# Patient Record
Sex: MALE | Race: WHITE | NOT HISPANIC OR LATINO | URBAN - METROPOLITAN AREA
[De-identification: names, ages, dates, MRNs, and addresses within clinical notes are randomized per-mention and may not be internally consistent; named-entity substitution may affect disease eponyms.]

---

## 2018-05-15 ENCOUNTER — OFFICE VISIT - HEALTHEAST (OUTPATIENT)
Dept: FAMILY MEDICINE | Facility: CLINIC | Age: 43
End: 2018-05-15

## 2018-05-15 ENCOUNTER — RECORDS - HEALTHEAST (OUTPATIENT)
Dept: GENERAL RADIOLOGY | Facility: CLINIC | Age: 43
End: 2018-05-15

## 2018-05-15 DIAGNOSIS — M79.671 PAIN IN RIGHT FOOT: ICD-10-CM

## 2018-05-15 DIAGNOSIS — M79.671 RIGHT FOOT PAIN: ICD-10-CM

## 2018-05-15 DIAGNOSIS — M19.071 OSTEOARTHRITIS OF RIGHT FOOT: ICD-10-CM

## 2018-06-01 ENCOUNTER — OFFICE VISIT - HEALTHEAST (OUTPATIENT)
Dept: FAMILY MEDICINE | Facility: CLINIC | Age: 43
End: 2018-06-01

## 2018-06-01 DIAGNOSIS — J02.0 STREP PHARYNGITIS: ICD-10-CM

## 2018-06-01 LAB — DEPRECATED S PYO AG THROAT QL EIA: ABNORMAL

## 2018-06-01 RX ORDER — ATORVASTATIN CALCIUM 20 MG/1
20 TABLET, FILM COATED ORAL DAILY
Refills: 3 | Status: SHIPPED | COMMUNITY
Start: 2018-05-08

## 2018-06-01 RX ORDER — LISINOPRIL 10 MG/1
10 TABLET ORAL DAILY
Refills: 11 | Status: SHIPPED | COMMUNITY
Start: 2018-05-08

## 2021-05-01 ENCOUNTER — HEALTH MAINTENANCE LETTER (OUTPATIENT)
Age: 46
End: 2021-05-01

## 2021-06-01 VITALS — WEIGHT: 199 LBS

## 2021-06-01 VITALS — WEIGHT: 193 LBS

## 2021-06-18 NOTE — PROGRESS NOTES
Assessment:     1. Right foot pain  XR Foot Right 3 or More VWS   2. Osteoarthritis of right foot       Xr Foot Right 3 Or More Vws    Result Date: 5/15/2018  XR FOOT RIGHT 3 OR MORE VWS 5/15/2018 5:59 PM INDICATION: R/o degenerative changes vs fracture COMPARISON: None. FINDINGS: No acute fracture detected. No dislocation. Mild degenerative osteoarthritis of the first metatarsal phalangeal joint.           Plan:     Differential diagnosis not limited to right foot dislocation versus fracture versus degenerative arthritis.  Discussed with the patient about x-ray results, patient with degenerative changes.  Advised him to use over-the-counter ibuprofen or Tylenol to relieve the pain.  He may include multivitamin supplement and his diet, may use Ace wrap, apply ice to the affected area.  Monitor for worsening symptoms.  May follow-up with PCP if symptoms does not resolve after the suggested treatment.  Patient verbalized understanding the plan of care.    Subjective:       42 y.o. male presents for evaluation of right foot pain, he reports that he has been experiencing the pain for more than 10 years.  He reports that the pain is worse especially when he is walking and heel lift heel off the ground to sprint forward.  Initially he used to experience the pain every so often and it lasted only for seconds.  For the last 2-3 weeks he has been experiencing the pain 3-4 times and lasting for hours to days.  He reports that he is unable to bear weight.  He reports that is more painful when he is pressing down to walk.  Currently he does not have any pain, he has not taken any medications for this condition.  He reports that he has history of left foot fracture when he was about 20 or 21 years old.  He had a contrast, he denies any other symptoms.  Currently stopped smoking about 2 weeks ago, he is using Chantix.  He reports some numbness at times when his right foot pain.    The following portions of the patient's history  were reviewed and updated as appropriate: allergies, current medications, past family history, past medical history, past social history, past surgical history and problem list.    Review of Systems  A 12 point comprehensive review of systems was negative except as noted.     Objective:      /86  Pulse 77  Temp 97  F (36.1  C) (Oral)   Resp 15  Wt 199 lb (90.3 kg)  SpO2 99%  General appearance: alert, appears stated age, cooperative and mild distress  Head: Normocephalic, without obvious abnormality, atraumatic  Lungs: clear to auscultation bilaterally  Heart: regular rate and rhythm, S1, S2 normal, no murmur, click, rub or gallop  Extremities: extremities normal, atraumatic, no cyanosis or edema and right foot pain with plantaflex, full ROM, moderate pain with palpation.   Skin: Skin color, texture, turgor normal. No rashes or lesions  Neurologic: Grossly normal     This note has been dictated using voice recognition software. Any grammatical or context distortions are unintentional and inherent to the software

## 2021-06-18 NOTE — PROGRESS NOTES
Assessment:       Strep throat.      Plan:       Patient placed on antibiotics.   OTC analgesics discussed  Viscous lidocaine 2% per orders  Discussed signs of worsening symptoms and when to follow-up with PCP if no symptom improvement.      Patient Instructions   Your rapid strep test was positive today. We will treat with a course of antibiotics. Please complete the full course of antibiotics. You can take your medication with food and with a probiotic such as Culturelle to prevent stomach irritation. You will be contagious for 24 hours following initiation of the medication.    You may use Tylenol or Motrin for pain and fevers.    May drink warm tea, gargle saline solution, or use throat lozenges to sooth throat pain.    Change toothbrush after 24 hours of starting the antibiotic to prevent reinfection.    Watch for resolution of symptoms in the next few days. If you continue to have high fevers, begin to have difficulty swallowing or breathing, if you notice neck pain or difficulty moving neck, please return to clinic or present to the ER immediately.  Otherwise, follow up with your PCP as needed.      Subjective:        History was provided by the patient.  Salvatore Milan is a 42 y.o. male who presents for evaluation of a sore throat. Associated symptoms include swollen neck, difficulty swallowing, fever of 101 max, and headaches. Onset of symptoms was 1 day ago, rapidly worsening since that time.  He is drinking plenty of fluids. He has not had recent close exposure to someone with proven streptococcal pharyngitis. Denies cough and rhinorrhea.    The following portions of the patient's history were reviewed and updated as appropriate: allergies, current medications and problem list.    Review of Systems  Pertinent items are noted in HPI.    Allergies  No Known Allergies      Objective:       /86  Pulse (!) 101  Temp 99.4  F (37.4  C) (Oral)   Resp 16  Wt 193 lb (87.5 kg)  SpO2 97%  General  appearance: alert, appears stated age, cooperative, no distress and non-toxic  Head: Normocephalic, without obvious abnormality, atraumatic  Ears: normal TM's and external ear canals both ears  Nose: no discharge  Throat: moderate tonsil swelling with severe erythema and exudate bilaterally; MMM, lips and tongue normal  Neck: marked anterior cervical adenopathy and supple, symmetrical, trachea midline  Lungs: clear to auscultation bilaterally and no rhonchi, rales, or wheezing  Heart: regular rate and rhythm, S1, S2 normal, no murmur, click, rub or gallop    Lab Results    Recent Results (from the past 24 hour(s))   Rapid Strep A Screen-Throat   Result Value Ref Range    Rapid Strep A Antigen Group A Strep detected (!) No Group A Strep detected, presumptive negative       I personally reviewed these results and discussed findings with the patient.

## 2021-10-11 ENCOUNTER — HEALTH MAINTENANCE LETTER (OUTPATIENT)
Age: 46
End: 2021-10-11

## 2022-05-22 ENCOUNTER — HEALTH MAINTENANCE LETTER (OUTPATIENT)
Age: 47
End: 2022-05-22

## 2022-09-25 ENCOUNTER — HEALTH MAINTENANCE LETTER (OUTPATIENT)
Age: 47
End: 2022-09-25

## 2023-06-04 ENCOUNTER — HEALTH MAINTENANCE LETTER (OUTPATIENT)
Age: 48
End: 2023-06-04

## 2023-06-04 ENCOUNTER — NURSE TRIAGE (OUTPATIENT)
Dept: NURSING | Facility: CLINIC | Age: 48
End: 2023-06-04

## 2023-06-04 NOTE — TELEPHONE ENCOUNTER
Pt is calling with a general question that writer was able to answer     No triage     Joan Stoll, RN  [facility] Advisor  12:39 PM 6/4/2023      Reason for Disposition    General information question, no triage required and triager able to answer question    Additional Information    Negative: [1] Caller is not with the adult (patient) AND [2] reporting urgent symptoms    Negative: Lab result questions    Negative: Medication questions    Negative: Caller can't be reached by phone    Negative: Caller has already spoken to PCP or another triager    Negative: RN needs further essential information from caller in order to complete triage    Negative: Requesting regular office appointment    Negative: [1] Caller requesting NON-URGENT health information AND [2] PCP's office is the best resource    Negative: Health Information question, no triage required and triager able to answer question    Protocols used: INFORMATION ONLY CALL - NO TRIAGE-A-